# Patient Record
Sex: MALE | Race: WHITE
[De-identification: names, ages, dates, MRNs, and addresses within clinical notes are randomized per-mention and may not be internally consistent; named-entity substitution may affect disease eponyms.]

---

## 2020-09-20 ENCOUNTER — HOSPITAL ENCOUNTER (EMERGENCY)
Dept: HOSPITAL 74 - JER | Age: 75
Discharge: HOME | End: 2020-09-20
Payer: COMMERCIAL

## 2020-09-20 VITALS — SYSTOLIC BLOOD PRESSURE: 115 MMHG | HEART RATE: 75 BPM | DIASTOLIC BLOOD PRESSURE: 72 MMHG | TEMPERATURE: 98.3 F

## 2020-09-20 VITALS — BODY MASS INDEX: 25.8 KG/M2

## 2020-09-20 DIAGNOSIS — R33.9: Primary | ICD-10-CM

## 2020-09-20 LAB
ALBUMIN SERPL-MCNC: 4.1 G/DL (ref 3.4–5)
ALP SERPL-CCNC: 104 U/L (ref 45–117)
ALT SERPL-CCNC: 29 U/L (ref 13–61)
ANION GAP SERPL CALC-SCNC: 4 MMOL/L (ref 8–16)
APPEARANCE UR: CLEAR
AST SERPL-CCNC: 51 U/L (ref 15–37)
BACTERIA # UR AUTO: 8 /UL (ref 0–1359)
BASOPHILS # BLD: 0.4 % (ref 0–2)
BILIRUB SERPL-MCNC: 0.8 MG/DL (ref 0.2–1)
BILIRUB UR STRIP.AUTO-MCNC: NEGATIVE MG/DL
BUN SERPL-MCNC: 18.5 MG/DL (ref 7–18)
CALCIUM SERPL-MCNC: 9.1 MG/DL (ref 8.5–10.1)
CASTS URNS QL MICRO: 0 /UL (ref 0–3.1)
CHLORIDE SERPL-SCNC: 107 MMOL/L (ref 98–107)
CO2 SERPL-SCNC: 29 MMOL/L (ref 21–32)
COLOR UR: YELLOW
CREAT SERPL-MCNC: 1.2 MG/DL (ref 0.55–1.3)
DEPRECATED RDW RBC AUTO: 14.2 % (ref 11.9–15.9)
EOSINOPHIL # BLD: 0.3 % (ref 0–4.5)
EPITH CASTS URNS QL MICRO: 3 /UL (ref 0–25.1)
GLUCOSE SERPL-MCNC: 109 MG/DL (ref 74–106)
HCT VFR BLD CALC: 49.6 % (ref 35.4–49)
HGB BLD-MCNC: 16.9 GM/DL (ref 11.7–16.9)
KETONES UR QL STRIP: NEGATIVE
LEUKOCYTE ESTERASE UR QL STRIP.AUTO: NEGATIVE
LYMPHOCYTES # BLD: 11.2 % (ref 8–40)
MCH RBC QN AUTO: 32.2 PG (ref 25.7–33.7)
MCHC RBC AUTO-ENTMCNC: 34.2 G/DL (ref 32–35.9)
MCV RBC: 94.3 FL (ref 80–96)
MONOCYTES # BLD AUTO: 5.4 % (ref 3.8–10.2)
NEUTROPHILS # BLD: 82.7 % (ref 42.8–82.8)
NITRITE UR QL STRIP: NEGATIVE
PH UR: 6 [PH] (ref 5–8)
PLATELET # BLD AUTO: 112 K/MM3 (ref 134–434)
PMV BLD: 9.5 FL (ref 7.5–11.1)
POTASSIUM SERPLBLD-SCNC: 4.4 MMOL/L (ref 3.5–5.1)
PROT SERPL-MCNC: 6.8 G/DL (ref 6.4–8.2)
PROT UR QL STRIP: (no result)
PROT UR QL STRIP: NEGATIVE
RBC # BLD AUTO: 434 /UL (ref 0–23.9)
RBC # BLD AUTO: 5.26 M/MM3 (ref 4–5.6)
SODIUM SERPL-SCNC: 141 MMOL/L (ref 136–145)
SP GR UR: 1.01 (ref 1.01–1.03)
UROBILINOGEN UR STRIP-MCNC: 0.2 MG/DL (ref 0.2–1)
WBC # BLD AUTO: 8.4 K/MM3 (ref 4–10)
WBC # UR AUTO: 2 /UL (ref 0–25.8)

## 2020-09-20 NOTE — PDOC
Documentation entered by Giuliana Garcia SCRIBE, acting as scribe for 

Marian Dumont MD.








Marian Dumont MD:  This documentation has been prepared by the Zaira walker Sydney, SCRIBE, under my direction and personally reviewed by me in its 

entirety.  I confirm that the documentation accurately reflects all work, 

treatment, procedures, and medical decision making performed by me.  





Attending Attestation





- Resident


Resident Name: Cristal Michael





- ED Attending Attestation


I have performed the following: I have examined & evaluated the patient, The 

case was reviewed & discussed with the resident, I agree w/resident's findings &

plan, Exceptions are as noted





- HPI


HPI: 





09/20/20 17:06


this 74 yo male has has history of BPH and developed urinary retention today. 

This has occurred before in  the remote past.





Patient is a 75 year old male with a significant past medical history of BPH who

presents to the ED with one day of urinary retention. As per patient, he has 

been unable to urinate since last night. Patient endorses similar symptoms have 

happened in the past, where long motorcycle rides are exacerbating factors.





Denies headache, fever, chills, shortness of breath, chest pain, abdominal pain,

nausea, vomiting, or diarrhea.





Allergies: NKDA


PCP: Dr. Castro





- Physicial Exam


PE: 





09/20/20 17:08


74 yo male looking younger than stated age


head ncat


cvs [id number]


lungs cta 


abdomen no rebound


distended bladder


extremities no erythema, no edema


skin warm and dry


neuro axox3 ,ambulatory 





- Medical Decision Making





09/20/20 17:09


will place lidocaine jelly and try to place moeller 


Moeller was placed and > 500 cc urine obtained


UA was negative for infection


09/20/20 18:18


labs are unremarkable


09/20/20 19:09


pt given a leg bag and discharged home to follow up with urology,Dr John Gould


09/20/20 19:12


imp  BPH,Urinary retention





Discharge





- Discharge Information


Problems reviewed: Yes


Clinical Impression/Diagnosis: 


 Urinary retention





Condition: Improved


Disposition: HOME





- Follow up/Referral


Referrals: 


Abhi Coleman MD [Staff Physician] - 


Landau,Steven [Primary Care Provider] - 





- Patient Discharge Instructions


Patient Printed Discharge Instructions:  How to Care for Your Moeller Catheter -- 

Male, DI for Urinary Retention in Men


Additional Instructions: 


You came to the ED because of urinary retention. 





We were able to insert a catheter to drain your urine.


We did labs which all came back normal.





We are referring you to a urologist. Please call them tomorrow to make an 

appointment to be seen.





Please return with any new or worsening symptoms. Return to the ED if your urine

stops draining from the catheter, if you start having blood in your urine, or if

your abdominal pain returns. 





- Post Discharge Activity

## 2020-09-20 NOTE — XMS
Summarization Of Episode

                          Created on:2020



Patient:DURAN CEE

Sex:Male

:1945

External Reference #:[id number]





Demographics







                          Address                   [address]



                                                    [place] [address]

 

                          Home Phone                [phone]

 

                          Preferred Language        English

 

                          Marital Status            Not  or 

 

                          Nondenominational Affiliation     JE

 

                          Race                      WH

 

                          Ethnic Group              Not  or 









Author







                          Organization              [facility]









Support







                Name            Relationship    Address         Phone

 

                RE              Unavailable     Unavailable     Unavailable

 

                SELF            Unavailable     Unavailable     Unavailable

 

                RIKY CEE   WIFE            [address] [phone]



                                                [place] [id number] 









Care Team Providers







                    Name                Role                Phone

 

                    Landau, Steven MD   Unavailable         Unavailable









Re-disclosure Warning

The records that you are about to access may contain information from federally-
assisted alcohol or drug abuse programs. If such information is present, then 
the following federally mandated warning applies: This information has been 
disclosed to you from records protected by federal confidentiality rules (42 CFR
part 2). The federal rules prohibit you from making any further disclosure of 
this information unless further disclosure is expressly permitted by the written
consent of the person to whom it pertains or as otherwise permitted by 42 CFR 
part 2. A general authorization for the release of medical or other information 
is NOT sufficient for this purpose. The Federal rules restrict any use of the 
information to criminally investigate or prosecute any alcohol or drug abuse 
patient.The records that you are about to access may contain highly sensitive 
health information, the redisclosure of which is protected by Article 27-F of 
the [facility] Public Health law. If you continue you may haveaccess to 
information: Regarding HIV / AIDS; Provided by facilities licensed or operated 
by the [facility] Office of Mental Health; or Provided by the [facility]
Office for People With Developmental Disabilities. If such information is 
present, then the following [place] mandated warning applies: This 
information has been disclosed to you from confidential records which are 
protected by state law. State law prohibits you from making any further 
disclosure of this information without the specific written consent of the 
person to whom it pertains, or as otherwise permitted by law. Any unauthorized 
further disclosure in violation of state law may result in a fine or skilled nursing 
sentence or both. A general authorization for the release of medical or other 
information is NOT sufficient authorization for further disclosure.



Allergies and Adverse Reactions







           Type       Description Substance  Reaction   Status     Data Source(s

)

 

           Drug allergy No Known Allergies No Known Allergies                   

    [facility]







Encounters







           Encounter  Providers  Location   Date       Indications Data Source(s

)

 

           Outpatient Attender: Quinton            2020 LUNG NODULES [place] Landau MD             09:24:00 AM            Hospital



                                            EDT                   









                                        [facility]







Insurance Providers







          Payer name Policy type Policy ID Covered   Covered party's Policy    P

marc



                    / Coverage           party ID  relationship to Childs    Inf

ormation



                    type                          childs              

 

          [employer]              [id number]                             [id number]



          HEALTHCARE PPO                                                   

 

          MEDICARE            [id number]           SP                  [id number]

T41

 

          [employer]              [id number]           PT                  [id number]



          HEATHCARE                                                   



          OTHER                                                       

 

          MEDICARE            [id number]           PT                  [id number]

T41







Problems, Conditions, and Diagnoses







           Code       Display Name Description Problem Type Effective Dates Data

 Source(s)

 

           R91.8      Other nonspecific R91.8      Diagnosis  2020 [facility]

[facility]



                      abnormal finding of                       09:24:00 AM EDT 

Hospital



                      lung field                                  







Results







                    ID                  Date                Data Source

 

                    [id number]         2020 01:31:00 PM EDT [facility]











          Name      Value     Range     Interpretation Description Data      Sup

porting



                                        Code                Source(s) Document(s

)

 

          SARS                                              [facility]   



          coronavirus 2                                                   



          RNA                                                         









                                        [facility] and reported by [facility].











                                        Procedure

## 2020-09-20 NOTE — PDOC
History of Present Illness





- General


Chief Complaint: Urinary Problem


Stated Complaint: URINARY PROBLEM


Time Seen by Provider: 09/20/20 16:15


History Source: Patient


Exam Limitations: No Limitations





- History of Present Illness


Initial Comments: 





09/20/20 16:16


HPI:


This is a 76 y/o male with a PMH of BPH presenting to the ED due to urinary 

retention since last night. Per the patient his BPH is always exacerbated when 

he travels because he rides a motorcycle. He just returned from a trip to 

[place] yesterday, and the last time he was able to significantly empty his 

bladder was last night. This morning he was only able to urinate a few drops. He

denies hematuria, dysuria, fever/chills. He admits to abdominal discomfort due 

to a full bladder. This has happened to him before but never this badly and has 

always resolved itself with tamsulosin. Patient does not currently have a 

urologist. 








PCP: Dr Landau





ROS:


Constitutional - Pt denies Fever, Chills


Respiratory: Denies cough, sob


Cardiac: denies chest pain, palpitations, light headedness


Abd/GI: Admits to suprapubic discomfort, Denies nausea, vomiting


: denies dysuria, frequency, discharge


Musculskelatal - denies back pain, joint swelling


skin - denies bruising, erythema, rash


neurological: denies headache, weakness





PMH: BPH, afib


PSH: Orthopedic


Social Hx: Denied tobacco. Admits to etoh


Meds: Tamsulosin, diltiazem, ASA


Allergies: KNDA





PE:


ADULT Physical ExaM


GENERAL: The patient is awake, alert, and fully oriented, Nontoxic. Patient is 

pacing. Patient is unable to lay down due to abdominal discomfort. 


HEAD: Normocephalic, atraumatic.


EYES: extraocular movements intact, sclera anicteric


ENT: Normal voice,  Moist mucous membranes.


NECK: Normal range of motion, supple without lymphadenopathy


LUNGS: Breath sounds equal, clear to auscultation bilaterally.  No wheezes, no 

crackles, no rales.


HEART: Regular rate and rhythm, normal S1 and S2 without murmur


ABDOMEN: Tender to palpation in suprapubic area. Can palpate bladder. 


EXTREMITIES: Normal range of motion, no edema.  








MDM:


09/20/20 16:20


This is a 76 y/o male with a PMH of BPH presenting to the ED due to urinary 

retention since last night.


- Known hx of BPH and retention


- Never lasted this long. Always resolved.


- Will straight cath


- CMP to check kidney function


- CBC to check for infection


- Urine to check for infection and kidney function





09/20/20 18:11


Lidocaine jelly was inserted, catheter was passed and 1L of urine was drained


- Catheter clamped after 1L drainage.


- Patient reports immediate improvement in abdominal pain after insertion. 





09/20/20 18:46


- Drained 1200 cc's total


- Labs wnl. Kidney function good. Urine with no sign of infection


- Will d/c with reynoso and leg bag, follow-up with urology and return precaut

ions. 











Past History





- Medical History


Allergies/Adverse Reactions: 


                                    Allergies











Allergy/AdvReac Type Severity Reaction Status Date / Time


 


No Known Allergies Allergy   Verified 09/20/20 16:17











Home Medications: 


Ambulatory Orders





Aspirin Coated [Ecotrin -] 81 mg PO DAILY 09/20/20 


Tamsulosin HCl [Flomax] 0.4 mg PO DAILY 09/20/20 








COPD: No


Other medical history: enlarged prostate





- Psycho-Social/Smoking History


Smoking History: Never smoked





- Substance Abuse Hx (Audit-C & DAST Scrn)


How often the patient has a drink containing alcohol: Monthly or less


Score: In Men: 4 or > Positive; In Women: 3 or > Positive: 1


Screen Result (Pos requires Nsg. Audit-10AR): Negative





*Physical Exam





- Vital Signs


                                Last Vital Signs











Temp Pulse Resp BP Pulse Ox


 


 98.3 F   75   20   115/72   99 


 


 09/20/20 16:05  09/20/20 16:05  09/20/20 16:05  09/20/20 16:05  09/20/20 16:05














ED Treatment Course





- LABORATORY


CBC & Chemistry Diagram: 


                                 09/20/20 17:20





                                 09/20/20 17:20





Discharge





- Discharge Information


Problems reviewed: Yes


Clinical Impression/Diagnosis: 


 Urinary retention





Condition: Improved


Disposition: HOME





- Admission


No





- Follow up/Referral


Referrals: 


Landau,Steven [Primary Care Provider] - 


Abhi Coleman MD [Staff Physician] - 





- Patient Discharge Instructions


Patient Printed Discharge Instructions:  How to Care for Your Reynoso Catheter -- 

Male, DI for Urinary Retention in Men


Additional Instructions: 


You came to the ED because of urinary retention. 





We were able to insert a catheter to drain your urine.


We did labs which all came back normal.





We are referring you to a urologist. Please call them tomorrow to make an 

appointment to be seen.





Please return with any new or worsening symptoms. Return to the ED if your urine

stops draining from the catheter, if you start having blood in your urine, or if

your abdominal pain returns. 





- Post Discharge Activity

## 2022-11-01 ENCOUNTER — OFFICE (OUTPATIENT)
Dept: URBAN - METROPOLITAN AREA CLINIC 29 | Facility: CLINIC | Age: 77
Setting detail: OPHTHALMOLOGY
End: 2022-11-01
Payer: MEDICARE

## 2022-11-01 DIAGNOSIS — H11.153: ICD-10-CM

## 2022-11-01 DIAGNOSIS — H01.001: ICD-10-CM

## 2022-11-01 DIAGNOSIS — H40.033: ICD-10-CM

## 2022-11-01 DIAGNOSIS — H01.004: ICD-10-CM

## 2022-11-01 DIAGNOSIS — H01.005: ICD-10-CM

## 2022-11-01 DIAGNOSIS — H01.002: ICD-10-CM

## 2022-11-01 DIAGNOSIS — H40.013: ICD-10-CM

## 2022-11-01 DIAGNOSIS — H25.13: ICD-10-CM

## 2022-11-01 PROCEDURE — 92020 GONIOSCOPY: CPT | Performed by: OPHTHALMOLOGY

## 2022-11-01 PROCEDURE — 92014 COMPRE OPH EXAM EST PT 1/>: CPT | Performed by: OPHTHALMOLOGY

## 2022-11-01 PROCEDURE — 92133 CPTRZD OPH DX IMG PST SGM ON: CPT | Performed by: OPHTHALMOLOGY

## 2022-11-01 ASSESSMENT — DECREASING TEAR LAKE - SEVERITY SCORE
OS_DEC_TEARLAKE: T
OD_DEC_TEARLAKE: T

## 2022-11-01 ASSESSMENT — SPHEQUIV_DERIVED
OS_SPHEQUIV: 1.5
OD_SPHEQUIV: 2.875
OD_SPHEQUIV: 2.125
OS_SPHEQUIV: 1.5
OD_SPHEQUIV: 2.625
OS_SPHEQUIV: 1.375

## 2022-11-01 ASSESSMENT — REFRACTION_CURRENTRX
OD_OVR_VA: 20/
OS_ADD: +2.75
OD_SPHERE: +1.50
OD_CYLINDER: +1.25
OD_ADD: +2.75
OD_AXIS: 016
OS_AXIS: 005
OS_CYLINDER: +1.00
OS_SPHERE: +1.00
OS_OVR_VA: 20/

## 2022-11-01 ASSESSMENT — REFRACTION_AUTOREFRACTION
OS_SPHERE: +0.50
OD_AXIS: 008
OS_CYLINDER: +1.75
OD_CYLINDER: +1.75
OS_AXIS: 169
OD_SPHERE: +2.00

## 2022-11-01 ASSESSMENT — TONOMETRY
OD_IOP_MMHG: 18
OS_IOP_MMHG: 16

## 2022-11-01 ASSESSMENT — LID EXAM ASSESSMENTS
OD_BLEPHARITIS: RLL RUL T 1+
OS_BLEPHARITIS: LLL LUL T 1+

## 2022-11-01 ASSESSMENT — AXIALLENGTH_DERIVED
OS_AL: 23.4673
OS_AL: 23.4673
OD_AL: 23.2288
OS_AL: 23.5156
OD_AL: 22.9489
OD_AL: 23.0415

## 2022-11-01 ASSESSMENT — KERATOMETRY
METHOD_AUTO_MANUAL: AUTO
OD_K2POWER_DIOPTERS: 42.75
OS_K2POWER_DIOPTERS: 42.75
OD_AXISANGLE_DEGREES: 011
OS_AXISANGLE_DEGREES: 163
OD_K1POWER_DIOPTERS: 41.75
OS_K1POWER_DIOPTERS: 41.75

## 2022-11-01 ASSESSMENT — REFRACTION_MANIFEST
OS_SPHERE: +1.00
OD_CYLINDER: +1.25
OS_AXIS: 5
OD_SPHERE: +2.00
OD_AXIS: 15
OS_CYLINDER: +1.00
OD_ADD: +2.75
OS_SPHERE: +1.00
OD_SPHERE: +1.50
OD_AXIS: 15
OS_CYLINDER: +1.00
OS_AXIS: 5
OS_ADD: +2.75
OD_CYLINDER: +1.25

## 2022-11-01 ASSESSMENT — VISUAL ACUITY
OD_BCVA: 20/20
OS_BCVA: 20/20

## 2022-11-01 ASSESSMENT — CONFRONTATIONAL VISUAL FIELD TEST (CVF)
OS_FINDINGS: FULL
OD_FINDINGS: FULL

## 2023-05-11 ENCOUNTER — OFFICE (OUTPATIENT)
Dept: URBAN - METROPOLITAN AREA CLINIC 30 | Facility: CLINIC | Age: 78
Setting detail: OPHTHALMOLOGY
End: 2023-05-11
Payer: MEDICARE

## 2023-05-11 DIAGNOSIS — H11.153: ICD-10-CM

## 2023-05-11 DIAGNOSIS — H52.4: ICD-10-CM

## 2023-05-11 DIAGNOSIS — H25.13: ICD-10-CM

## 2023-05-11 DIAGNOSIS — H01.005: ICD-10-CM

## 2023-05-11 DIAGNOSIS — H40.013: ICD-10-CM

## 2023-05-11 DIAGNOSIS — H01.002: ICD-10-CM

## 2023-05-11 DIAGNOSIS — H40.033: ICD-10-CM

## 2023-05-11 PROCEDURE — 92020 GONIOSCOPY: CPT | Performed by: OPHTHALMOLOGY

## 2023-05-11 PROCEDURE — 92015 DETERMINE REFRACTIVE STATE: CPT | Performed by: OPHTHALMOLOGY

## 2023-05-11 PROCEDURE — 99214 OFFICE O/P EST MOD 30 MIN: CPT | Performed by: OPHTHALMOLOGY

## 2023-05-11 ASSESSMENT — REFRACTION_CURRENTRX
OD_ADD: +2.75
OS_OVR_VA: 20/
OD_SPHERE: +1.50
OD_CYLINDER: +1.25
OS_ADD: +2.75
OD_OVR_VA: 20/
OD_AXIS: 016
OS_AXIS: 005
OS_CYLINDER: +1.00
OS_SPHERE: +1.00

## 2023-05-11 ASSESSMENT — REFRACTION_MANIFEST
OD_CYLINDER: +1.25
OD_SPHERE: +1.50
OS_SPHERE: +1.00
OS_AXIS: 5
OS_SPHERE: +1.00
OD_AXIS: 15
OD_AXIS: 15
OS_ADD: +2.75
OS_AXIS: 5
OS_SPHERE: +1.00
OD_CYLINDER: +1.50
OD_SPHERE: +2.00
OD_VA2: 20/20(J1+)
OS_CYLINDER: +1.00
OD_VA1: 20/25+2
OD_ADD: +2.75
OS_CYLINDER: +1.00
OS_AXIS: 180
OS_CYLINDER: +1.00
OD_SPHERE: +1.50
OD_CYLINDER: +1.25
OS_ADD: +2.75
OD_AXIS: 15
OD_ADD: +2.75
OS_VA2: 20/20(J1+)
OS_VA1: 20/20

## 2023-05-11 ASSESSMENT — VISUAL ACUITY
OD_BCVA: 20/20
OS_BCVA: 20/20

## 2023-05-11 ASSESSMENT — DECREASING TEAR LAKE - SEVERITY SCORE
OD_DEC_TEARLAKE: T
OS_DEC_TEARLAKE: T

## 2023-05-11 ASSESSMENT — AXIALLENGTH_DERIVED
OS_AL: 23.4673
OS_AL: 23.5156
OS_AL: 23.4673
OD_AL: 23.1817
OD_AL: 22.9489
OD_AL: 23.0415
OD_AL: 23.2288
OS_AL: 23.4673

## 2023-05-11 ASSESSMENT — KERATOMETRY
OS_AXISANGLE_DEGREES: 163
OD_K1POWER_DIOPTERS: 41.75
METHOD_AUTO_MANUAL: AUTO
OS_K2POWER_DIOPTERS: 42.75
OD_K2POWER_DIOPTERS: 42.75
OS_K1POWER_DIOPTERS: 41.75
OD_AXISANGLE_DEGREES: 011

## 2023-05-11 ASSESSMENT — SPHEQUIV_DERIVED
OD_SPHEQUIV: 2.875
OS_SPHEQUIV: 1.5
OS_SPHEQUIV: 1.375
OS_SPHEQUIV: 1.5
OS_SPHEQUIV: 1.5
OD_SPHEQUIV: 2.125
OD_SPHEQUIV: 2.625
OD_SPHEQUIV: 2.25

## 2023-05-11 ASSESSMENT — CONFRONTATIONAL VISUAL FIELD TEST (CVF)
OD_FINDINGS: FULL
OS_FINDINGS: FULL

## 2023-05-11 ASSESSMENT — REFRACTION_AUTOREFRACTION
OD_AXIS: 008
OD_CYLINDER: +1.75
OS_SPHERE: +0.50
OS_AXIS: 169
OD_SPHERE: +2.00
OS_CYLINDER: +1.75

## 2023-05-11 ASSESSMENT — LID EXAM ASSESSMENTS
OS_BLEPHARITIS: LLL 1+
OD_BLEPHARITIS: RLL 1+

## 2023-05-11 ASSESSMENT — TONOMETRY
OD_IOP_MMHG: 17
OS_IOP_MMHG: 17

## 2024-01-08 ENCOUNTER — OFFICE (OUTPATIENT)
Dept: URBAN - METROPOLITAN AREA CLINIC 29 | Facility: CLINIC | Age: 79
Setting detail: OPHTHALMOLOGY
End: 2024-01-08
Payer: MEDICARE

## 2024-01-08 DIAGNOSIS — H25.13: ICD-10-CM

## 2024-01-08 DIAGNOSIS — H40.033: ICD-10-CM

## 2024-01-08 DIAGNOSIS — H01.002: ICD-10-CM

## 2024-01-08 DIAGNOSIS — H40.013: ICD-10-CM

## 2024-01-08 DIAGNOSIS — H01.005: ICD-10-CM

## 2024-01-08 DIAGNOSIS — H11.153: ICD-10-CM

## 2024-01-08 PROCEDURE — 92020 GONIOSCOPY: CPT | Performed by: OPHTHALMOLOGY

## 2024-01-08 PROCEDURE — 92014 COMPRE OPH EXAM EST PT 1/>: CPT | Performed by: OPHTHALMOLOGY

## 2024-01-08 PROCEDURE — 92133 CPTRZD OPH DX IMG PST SGM ON: CPT | Performed by: OPHTHALMOLOGY

## 2024-01-08 ASSESSMENT — REFRACTION_CURRENTRX
OS_ADD: +2.75
OD_ADD: +2.75
OD_SPHERE: +1.50
OS_SPHERE: +1.00
OD_AXIS: 016
OS_CYLINDER: +1.00
OD_CYLINDER: +1.25
OS_OVR_VA: 20/
OS_AXIS: 005
OD_OVR_VA: 20/

## 2024-01-08 ASSESSMENT — CONFRONTATIONAL VISUAL FIELD TEST (CVF)
OD_FINDINGS: FULL
OS_FINDINGS: FULL

## 2024-01-08 ASSESSMENT — REFRACTION_MANIFEST
OD_SPHERE: +1.50
OS_CYLINDER: +1.00
OS_AXIS: 180
OS_ADD: +2.75
OD_ADD: +2.75
OS_SPHERE: +1.00
OD_CYLINDER: +1.25
OS_VA1: 20/20
OD_VA1: 20/25+2
OS_AXIS: 5
OS_AXIS: 5
OD_AXIS: 15
OD_ADD: +2.75
OD_SPHERE: +1.50
OS_CYLINDER: +1.00
OS_VA2: 20/20(J1+)
OD_CYLINDER: +1.25
OD_VA2: 20/20(J1+)
OD_AXIS: 15
OD_SPHERE: +2.00
OS_CYLINDER: +1.00
OS_SPHERE: +1.00
OD_CYLINDER: +1.50
OS_ADD: +2.75
OD_AXIS: 15
OS_SPHERE: +1.00

## 2024-01-08 ASSESSMENT — SPHEQUIV_DERIVED
OD_SPHEQUIV: 2.25
OS_SPHEQUIV: 1.375
OD_SPHEQUIV: 2.875
OD_SPHEQUIV: 2.125
OS_SPHEQUIV: 1.5
OD_SPHEQUIV: 2.625

## 2024-01-08 ASSESSMENT — REFRACTION_AUTOREFRACTION
OS_SPHERE: +0.50
OD_CYLINDER: +1.75
OD_AXIS: 008
OS_AXIS: 169
OD_SPHERE: +2.00
OS_CYLINDER: +1.75

## 2024-01-08 ASSESSMENT — LID EXAM ASSESSMENTS
OD_BLEPHARITIS: RLL 1+
OS_BLEPHARITIS: LLL 1+

## 2024-01-08 ASSESSMENT — DECREASING TEAR LAKE - SEVERITY SCORE
OD_DEC_TEARLAKE: T
OS_DEC_TEARLAKE: T

## 2024-07-09 ENCOUNTER — OFFICE (OUTPATIENT)
Dept: URBAN - METROPOLITAN AREA CLINIC 29 | Facility: CLINIC | Age: 79
Setting detail: OPHTHALMOLOGY
End: 2024-07-09
Payer: MEDICARE

## 2024-07-09 DIAGNOSIS — H40.013: ICD-10-CM

## 2024-07-09 DIAGNOSIS — H11.153: ICD-10-CM

## 2024-07-09 DIAGNOSIS — H25.13: ICD-10-CM

## 2024-07-09 DIAGNOSIS — H40.033: ICD-10-CM

## 2024-07-09 DIAGNOSIS — H01.005: ICD-10-CM

## 2024-07-09 DIAGNOSIS — H01.002: ICD-10-CM

## 2024-07-09 PROCEDURE — 92020 GONIOSCOPY: CPT | Performed by: OPHTHALMOLOGY

## 2024-07-09 PROCEDURE — 92250 FUNDUS PHOTOGRAPHY W/I&R: CPT | Performed by: OPHTHALMOLOGY

## 2024-07-09 PROCEDURE — 99213 OFFICE O/P EST LOW 20 MIN: CPT | Performed by: OPHTHALMOLOGY

## 2024-07-09 PROCEDURE — 92083 EXTENDED VISUAL FIELD XM: CPT | Performed by: OPHTHALMOLOGY

## 2024-07-09 ASSESSMENT — CONFRONTATIONAL VISUAL FIELD TEST (CVF)
OS_FINDINGS: FULL
OD_FINDINGS: FULL

## 2024-07-09 ASSESSMENT — LID EXAM ASSESSMENTS
OD_BLEPHARITIS: RLL 1+
OS_BLEPHARITIS: LLL 1+

## 2025-03-31 ENCOUNTER — OFFICE (OUTPATIENT)
Dept: URBAN - METROPOLITAN AREA CLINIC 29 | Facility: CLINIC | Age: 80
Setting detail: OPHTHALMOLOGY
End: 2025-03-31
Payer: MEDICARE

## 2025-03-31 DIAGNOSIS — H25.13: ICD-10-CM

## 2025-03-31 DIAGNOSIS — H52.4: ICD-10-CM

## 2025-03-31 DIAGNOSIS — H01.002: ICD-10-CM

## 2025-03-31 DIAGNOSIS — H40.033: ICD-10-CM

## 2025-03-31 DIAGNOSIS — H40.013: ICD-10-CM

## 2025-03-31 DIAGNOSIS — H01.005: ICD-10-CM

## 2025-03-31 DIAGNOSIS — H16.223: ICD-10-CM

## 2025-03-31 DIAGNOSIS — H11.153: ICD-10-CM

## 2025-03-31 PROCEDURE — 92015 DETERMINE REFRACTIVE STATE: CPT | Performed by: OPHTHALMOLOGY

## 2025-03-31 PROCEDURE — 92014 COMPRE OPH EXAM EST PT 1/>: CPT | Performed by: OPHTHALMOLOGY

## 2025-03-31 PROCEDURE — 92133 CPTRZD OPH DX IMG PST SGM ON: CPT | Performed by: OPHTHALMOLOGY

## 2025-03-31 PROCEDURE — 92020 GONIOSCOPY: CPT | Performed by: OPHTHALMOLOGY

## 2025-03-31 ASSESSMENT — REFRACTION_MANIFEST
OD_CYLINDER: +1.25
OS_CYLINDER: +1.00
OS_AXIS: 180
OS_CYLINDER: +1.00
OS_SPHERE: +1.00
OD_AXIS: 15
OS_AXIS: 180
OS_ADD: +2.75
OD_VA2: 20/20(J1+)
OD_SPHERE: +1.50
OD_AXIS: 15
OD_ADD: +2.75
OS_VA2: 20/20(J1+)
OS_ADD: +2.75
OS_SPHERE: +1.00
OD_ADD: +2.75
OD_ADD: +2.75
OS_ADD: +2.75
OS_SPHERE: +1.00
OD_CYLINDER: +1.25
OD_VA1: 20/25+2
OS_VA2: 20/20(J1+)
OD_AXIS: 15
OD_SPHERE: +1.50
OD_CYLINDER: +1.50
OS_VA1: 20/20
OD_VA2: 20/20(J1+)
OD_AXIS: 15
OS_CYLINDER: +1.00
OS_CYLINDER: +1.00
OS_SPHERE: +1.00
OD_VA1: 20/25+2
OS_AXIS: 5
OS_AXIS: 5
OS_VA1: 20/20
OD_SPHERE: +2.00
OD_CYLINDER: +1.50
OD_SPHERE: +1.50

## 2025-03-31 ASSESSMENT — REFRACTION_CURRENTRX
OS_CYLINDER: +1.00
OD_SPHERE: +1.50
OS_SPHERE: +1.00
OD_CYLINDER: +1.00
OD_AXIS: 016
OD_OVR_VA: 20/
OD_AXIS: 020
OD_OVR_VA: 20/
OS_OVR_VA: 20/
OS_ADD: +2.75
OD_ADD: +2.50
OS_CYLINDER: +0.50
OS_ADD: +2.50
OS_OVR_VA: 20/
OS_AXIS: 005
OD_SPHERE: +1.25
OS_VPRISM_DIRECTION: PROGS
OS_SPHERE: +1.00
OD_CYLINDER: +1.25
OD_ADD: +2.75
OS_AXIS: 175
OD_VPRISM_DIRECTION: PROGS

## 2025-03-31 ASSESSMENT — CONFRONTATIONAL VISUAL FIELD TEST (CVF)
OS_FINDINGS: FULL
OD_FINDINGS: FULL

## 2025-03-31 ASSESSMENT — TONOMETRY
OD_IOP_MMHG: 15
OS_IOP_MMHG: 14

## 2025-03-31 ASSESSMENT — VISUAL ACUITY
OS_BCVA: 20/20-1
OD_BCVA: 20/20-1

## 2025-04-01 ENCOUNTER — NON-APPOINTMENT (OUTPATIENT)
Age: 80
End: 2025-04-01

## 2025-04-01 PROBLEM — Z00.00 ENCOUNTER FOR PREVENTIVE HEALTH EXAMINATION: Status: ACTIVE | Noted: 2025-04-01

## 2025-04-03 ENCOUNTER — APPOINTMENT (OUTPATIENT)
Dept: ORTHOPEDIC SURGERY | Facility: CLINIC | Age: 80
End: 2025-04-03
Payer: MEDICARE

## 2025-04-03 VITALS
BODY MASS INDEX: 26.07 KG/M2 | SYSTOLIC BLOOD PRESSURE: 106 MMHG | OXYGEN SATURATION: 98 % | WEIGHT: 176 LBS | DIASTOLIC BLOOD PRESSURE: 69 MMHG | HEART RATE: 93 BPM | HEIGHT: 69 IN

## 2025-04-03 DIAGNOSIS — M65.4 RADIAL STYLOID TENOSYNOVITIS [DE QUERVAIN]: ICD-10-CM

## 2025-04-03 PROCEDURE — 20550 NJX 1 TENDON SHEATH/LIGAMENT: CPT | Mod: LT

## 2025-04-03 PROCEDURE — 99204 OFFICE O/P NEW MOD 45 MIN: CPT | Mod: 25

## 2025-06-10 ENCOUNTER — TRANSCRIPTION ENCOUNTER (OUTPATIENT)
Age: 80
End: 2025-06-10